# Patient Record
Sex: FEMALE | Race: BLACK OR AFRICAN AMERICAN | Employment: PART TIME | URBAN - METROPOLITAN AREA
[De-identification: names, ages, dates, MRNs, and addresses within clinical notes are randomized per-mention and may not be internally consistent; named-entity substitution may affect disease eponyms.]

---
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
## 2021-06-29 ENCOUNTER — HOSPITAL ENCOUNTER (EMERGENCY)
Age: 21
Discharge: HOME | End: 2021-06-29
Attending: EMERGENCY MEDICINE
Payer: COMMERCIAL

## 2021-06-29 VITALS
HEART RATE: 71 BPM | HEIGHT: 66 IN | RESPIRATION RATE: 14 BRPM | WEIGHT: 124 LBS | BODY MASS INDEX: 19.93 KG/M2 | TEMPERATURE: 99 F | SYSTOLIC BLOOD PRESSURE: 125 MMHG | DIASTOLIC BLOOD PRESSURE: 78 MMHG | OXYGEN SATURATION: 100 %

## 2021-06-29 DIAGNOSIS — B37.31 VAGINAL THRUSH: ICD-10-CM

## 2021-06-29 DIAGNOSIS — E87.6 HYPOKALEMIA: ICD-10-CM

## 2021-06-29 DIAGNOSIS — A59.9 TRICHOMONIASIS: ICD-10-CM

## 2021-06-29 DIAGNOSIS — R35.89 POLYURIA: ICD-10-CM

## 2021-06-29 DIAGNOSIS — R11.2 NON-INTRACTABLE VOMITING WITH NAUSEA, UNSPECIFIED VOMITING TYPE: Primary | ICD-10-CM

## 2021-06-29 LAB
ALBUMIN SERPL-MCNC: 3.7 G/DL (ref 3.4–5)
ALBUMIN/GLOB SERPL: 1 {RATIO} (ref 0.8–1.7)
ALP SERPL-CCNC: 108 U/L (ref 45–117)
ALT SERPL-CCNC: 19 U/L (ref 13–56)
ANION GAP SERPL CALC-SCNC: 4 MMOL/L (ref 3–18)
APPEARANCE UR: CLEAR
AST SERPL-CCNC: 17 U/L (ref 10–38)
BASOPHILS # BLD: 0 K/UL (ref 0–0.1)
BASOPHILS NFR BLD: 1 % (ref 0–2)
BILIRUB SERPL-MCNC: 0.3 MG/DL (ref 0.2–1)
BILIRUB UR QL: NEGATIVE
BUN SERPL-MCNC: 10 MG/DL (ref 7–18)
BUN/CREAT SERPL: 13 (ref 12–20)
CALCIUM SERPL-MCNC: 8.5 MG/DL (ref 8.5–10.1)
CHLORIDE SERPL-SCNC: 108 MMOL/L (ref 100–111)
CO2 SERPL-SCNC: 28 MMOL/L (ref 21–32)
COLOR UR: YELLOW
CREAT SERPL-MCNC: 0.8 MG/DL (ref 0.6–1.3)
DIFFERENTIAL METHOD BLD: ABNORMAL
EOSINOPHIL # BLD: 0 K/UL (ref 0–0.4)
EOSINOPHIL NFR BLD: 1 % (ref 0–5)
EPITH CASTS URNS QL MICRO: NORMAL /LPF (ref 0–5)
ERYTHROCYTE [DISTWIDTH] IN BLOOD BY AUTOMATED COUNT: 13.2 % (ref 11.6–14.5)
GLOBULIN SER CALC-MCNC: 3.8 G/DL (ref 2–4)
GLUCOSE SERPL-MCNC: 102 MG/DL (ref 74–99)
GLUCOSE UR STRIP.AUTO-MCNC: NEGATIVE MG/DL
HCG SERPL QL: NEGATIVE
HCT VFR BLD AUTO: 40.2 % (ref 35–45)
HGB BLD-MCNC: 13 G/DL (ref 12–16)
HGB UR QL STRIP: NEGATIVE
KETONES UR QL STRIP.AUTO: 40 MG/DL
LEUKOCYTE ESTERASE UR QL STRIP.AUTO: ABNORMAL
LIPASE SERPL-CCNC: 103 U/L (ref 73–393)
LYMPHOCYTES # BLD: 1.4 K/UL (ref 0.9–3.6)
LYMPHOCYTES NFR BLD: 32 % (ref 21–52)
MCH RBC QN AUTO: 28.9 PG (ref 24–34)
MCHC RBC AUTO-ENTMCNC: 32.3 G/DL (ref 31–37)
MCV RBC AUTO: 89.3 FL (ref 74–97)
MONOCYTES # BLD: 0.3 K/UL (ref 0.05–1.2)
MONOCYTES NFR BLD: 8 % (ref 3–10)
NEUTS SEG # BLD: 2.5 K/UL (ref 1.8–8)
NEUTS SEG NFR BLD: 59 % (ref 40–73)
NITRITE UR QL STRIP.AUTO: NEGATIVE
PH UR STRIP: 6 [PH] (ref 5–8)
PLATELET # BLD AUTO: 194 K/UL (ref 135–420)
PMV BLD AUTO: 12.3 FL (ref 9.2–11.8)
POTASSIUM SERPL-SCNC: 3.1 MMOL/L (ref 3.5–5.5)
PROT SERPL-MCNC: 7.5 G/DL (ref 6.4–8.2)
PROT UR STRIP-MCNC: NEGATIVE MG/DL
RBC # BLD AUTO: 4.5 M/UL (ref 4.2–5.3)
RBC #/AREA URNS HPF: NORMAL /HPF (ref 0–5)
SERVICE CMNT-IMP: NORMAL
SODIUM SERPL-SCNC: 140 MMOL/L (ref 136–145)
SP GR UR REFRACTOMETRY: 1.02 (ref 1–1.03)
UROBILINOGEN UR QL STRIP.AUTO: 1 EU/DL (ref 0.2–1)
WBC # BLD AUTO: 4.3 K/UL (ref 4.6–13.2)
WBC URNS QL MICRO: NORMAL /HPF (ref 0–4)
WET PREP GENITAL: NORMAL

## 2021-06-29 PROCEDURE — 84703 CHORIONIC GONADOTROPIN ASSAY: CPT

## 2021-06-29 PROCEDURE — 80053 COMPREHEN METABOLIC PANEL: CPT

## 2021-06-29 PROCEDURE — 74011250637 HC RX REV CODE- 250/637: Performed by: EMERGENCY MEDICINE

## 2021-06-29 PROCEDURE — 87210 SMEAR WET MOUNT SALINE/INK: CPT

## 2021-06-29 PROCEDURE — 96372 THER/PROPH/DIAG INJ SC/IM: CPT

## 2021-06-29 PROCEDURE — 81001 URINALYSIS AUTO W/SCOPE: CPT

## 2021-06-29 PROCEDURE — 74011250636 HC RX REV CODE- 250/636: Performed by: EMERGENCY MEDICINE

## 2021-06-29 PROCEDURE — 83690 ASSAY OF LIPASE: CPT

## 2021-06-29 PROCEDURE — 99284 EMERGENCY DEPT VISIT MOD MDM: CPT

## 2021-06-29 PROCEDURE — 99283 EMERGENCY DEPT VISIT LOW MDM: CPT

## 2021-06-29 PROCEDURE — 87086 URINE CULTURE/COLONY COUNT: CPT

## 2021-06-29 PROCEDURE — 74011000250 HC RX REV CODE- 250: Performed by: EMERGENCY MEDICINE

## 2021-06-29 PROCEDURE — 87491 CHLMYD TRACH DNA AMP PROBE: CPT

## 2021-06-29 PROCEDURE — 85025 COMPLETE CBC W/AUTO DIFF WBC: CPT

## 2021-06-29 PROCEDURE — 74011250637 HC RX REV CODE- 250/637: Performed by: PHYSICIAN ASSISTANT

## 2021-06-29 PROCEDURE — 87591 N.GONORRHOEAE DNA AMP PROB: CPT

## 2021-06-29 RX ORDER — DOXYCYCLINE HYCLATE 100 MG
100 TABLET ORAL 2 TIMES DAILY
Qty: 14 TABLET | Refills: 0 | Status: SHIPPED | OUTPATIENT
Start: 2021-06-29 | End: 2021-07-06

## 2021-06-29 RX ORDER — FLUCONAZOLE 200 MG/1
200 TABLET ORAL
Status: COMPLETED | OUTPATIENT
Start: 2021-06-29 | End: 2021-06-29

## 2021-06-29 RX ORDER — ONDANSETRON 2 MG/ML
4 INJECTION INTRAMUSCULAR; INTRAVENOUS
Status: DISCONTINUED | OUTPATIENT
Start: 2021-06-29 | End: 2021-06-30 | Stop reason: HOSPADM

## 2021-06-29 RX ORDER — POTASSIUM CHLORIDE 20 MEQ/1
40 TABLET, EXTENDED RELEASE ORAL
Status: COMPLETED | OUTPATIENT
Start: 2021-06-29 | End: 2021-06-29

## 2021-06-29 RX ORDER — POTASSIUM CHLORIDE 20 MEQ/1
20 TABLET, EXTENDED RELEASE ORAL 3 TIMES DAILY
Qty: 9 TABLET | Refills: 0 | Status: SHIPPED | OUTPATIENT
Start: 2021-06-29 | End: 2021-07-02

## 2021-06-29 RX ORDER — ONDANSETRON 4 MG/1
TABLET, ORALLY DISINTEGRATING ORAL
Qty: 10 TABLET | Refills: 0 | Status: SHIPPED | OUTPATIENT
Start: 2021-06-29 | End: 2021-09-15

## 2021-06-29 RX ORDER — METRONIDAZOLE 500 MG/1
500 TABLET ORAL 2 TIMES DAILY
Qty: 14 TABLET | Refills: 0 | Status: SHIPPED | OUTPATIENT
Start: 2021-06-29 | End: 2021-07-06

## 2021-06-29 RX ORDER — DOXYCYCLINE 100 MG/1
100 CAPSULE ORAL
Status: COMPLETED | OUTPATIENT
Start: 2021-06-29 | End: 2021-06-29

## 2021-06-29 RX ADMIN — DOXYCYCLINE HYCLATE 100 MG: 100 CAPSULE ORAL at 21:30

## 2021-06-29 RX ADMIN — POTASSIUM CHLORIDE 40 MEQ: 1500 TABLET, EXTENDED RELEASE ORAL at 20:41

## 2021-06-29 RX ADMIN — FLUCONAZOLE 200 MG: 200 TABLET ORAL at 21:30

## 2021-06-29 RX ADMIN — LIDOCAINE HYDROCHLORIDE 500 MG: 10 INJECTION, SOLUTION EPIDURAL; INFILTRATION; INTRACAUDAL; PERINEURAL at 21:30

## 2021-06-29 NOTE — Clinical Note
[address] [facility] EMERGENCY DEPT  [id number] [facility] [address] [id number] [phone]    Work/School Note    Date: 6/29/2021    To Whom It May concern:    Gaurav Summers was seen and treated today in the emergency room by the following provider(s):  Attending Provider: Farooq Alonzo DO  Physician Assistant: Doug Rodriguez. Gaurav Summers is excused from work/school on 06/29/21 and 06/30/21. She is medically clear to return to work/school on 7/1/2021.        Sincerely,          EYAD Ko

## 2021-06-29 NOTE — ED TRIAGE NOTES
Patient c/o cramping to pelvic area x one month. C/o urinary frequency x 3 days. C/o nausea. States vomiting x one today. She states vomiting yestereday. LMP: Mid - May.

## 2021-06-29 NOTE — ED PROVIDER NOTES
EMERGENCY DEPARTMENT HISTORY AND PHYSICAL EXAM    5:55 PM      Date: 6/29/2021  Patient Name: Johanna Canseco    History of Presenting Illness     Chief Complaint   Patient presents with    Urinary Frequency    Nausea       History Provided By: Patient    Additional History (Context): Johanna Canseco is a 22 y.o. female with hx of seizures, UTI, and other noted PMH who presents with c/o lower abdominal cramping, n/v, and polyuria x 3 days. Denies fever or chills, chest pain, shortness of breath, diarrhea, dysuria, hematuria, vaginal discharge. Denies sick contacts. Notes concern for pregnancy, did not take a pregnancy test at home. LMP: 5/20    PCP: None    Current Facility-Administered Medications   Medication Dose Route Frequency Provider Last Rate Last Admin    ondansetron (ZOFRAN) injection 4 mg  4 mg IntraVENous NOW [place], [place]        potassium chloride (K-DUR, KLOR-CON) SR tablet 40 mEq  40 mEq Oral NOW [place], [place]         Current Outpatient Medications   Medication Sig Dispense Refill    ondansetron (Zofran ODT) 4 mg disintegrating tablet Take 1-2 tablets every 6-8 hours as needed for nausea and vomiting. 10 Tablet 0    potassium chloride (K-DUR, KLOR-CON) 20 mEq tablet Take 1 Tablet by mouth three (3) times daily for 3 days. 9 Tablet 0       Past History     Past Medical History:  Past Medical History:   Diagnosis Date    Chlamydia     Conjunctivitis     Gonorrhea     Ill-defined condition     Seizures (HCC)     Trichomoniasis     UTI (urinary tract infection)     Vaginal yeast infection        Past Surgical History:  History reviewed. No pertinent surgical history. Family History:  History reviewed. No pertinent family history. Social History:  Social History     Tobacco Use    Smoking status: Current Every Day Smoker    Smokeless tobacco: Never Used   Substance Use Topics    Alcohol use: No    Drug use: Yes     Types: Marijuana       Allergies:   Allergies   Allergen Reactions    Ciprofloxacin Rash         Review of Systems       Review of Systems   Constitutional: Negative for chills and fever. Respiratory: Negative for shortness of breath. Cardiovascular: Negative for chest pain. Gastrointestinal: Positive for nausea and vomiting. Negative for abdominal pain. Genitourinary: Positive for frequency. Skin: Negative for rash. Neurological: Negative for weakness. All other systems reviewed and are negative. Physical Exam     Visit Vitals  /89 (BP 1 Location: Left upper arm, BP Patient Position: At rest)   Pulse 71   Temp 99 °F (37.2 °C)   Resp 14   Ht 5' 6\" (1.676 m)   Wt 56.2 kg (124 lb)   LMP 05/15/2021 (Approximate)   SpO2 100%   BMI 20.01 kg/m²         Physical Exam  Vitals and nursing note reviewed. Constitutional:       General: She is not in acute distress. Appearance: Normal appearance. She is well-developed. She is not ill-appearing, toxic-appearing or diaphoretic. HENT:      Head: Normocephalic and atraumatic. Cardiovascular:      Rate and Rhythm: Normal rate and regular rhythm. Heart sounds: Normal heart sounds. No murmur heard. No friction rub. No gallop. Pulmonary:      Effort: Pulmonary effort is normal. No respiratory distress. Breath sounds: Normal breath sounds. No wheezing or rales. Abdominal:      General: Abdomen is flat. There is no distension. Palpations: Abdomen is soft. Tenderness: There is no abdominal tenderness. There is no right CVA tenderness, left CVA tenderness, guarding or rebound. Musculoskeletal:         General: Normal range of motion. Cervical back: Normal range of motion and neck supple. Skin:     General: Skin is warm. Findings: No rash. Neurological:      Mental Status: She is alert.            Diagnostic Study Results     Labs -  Recent Results (from the past 12 hour(s))   URINALYSIS W/ RFLX MICROSCOPIC    Collection Time: 06/29/21  6:01 PM   Result Value Ref Range    Color YELLOW      Appearance CLEAR      Specific gravity 1.025 1.005 - 1.030      pH (UA) 6.0 5.0 - 8.0      Protein Negative NEG mg/dL    Glucose Negative NEG mg/dL    Ketone 40 (A) NEG mg/dL    Bilirubin Negative NEG      Blood Negative NEG      Urobilinogen 1.0 0.2 - 1.0 EU/dL    Nitrites Negative NEG      Leukocyte Esterase SMALL (A) NEG     URINE MICROSCOPIC ONLY    Collection Time: 06/29/21  6:01 PM   Result Value Ref Range    WBC 0 to 3 0 - 4 /hpf    RBC 0 to 3 0 - 5 /hpf    Epithelial cells 1+ 0 - 5 /lpf   CBC WITH AUTOMATED DIFF    Collection Time: 06/29/21  6:59 PM   Result Value Ref Range    WBC 4.3 (L) 4.6 - 13.2 K/uL    RBC 4.50 4. 20 - 5.30 M/uL    HGB 13.0 12.0 - 16.0 g/dL    HCT 40.2 35.0 - 45.0 %    MCV 89.3 74.0 - 97.0 FL    MCH 28.9 24.0 - 34.0 PG    MCHC 32.3 31.0 - 37.0 g/dL    RDW 13.2 11.6 - 14.5 %    PLATELET 579 240 - 906 K/uL    MPV 12.3 (H) 9.2 - 11.8 FL    NEUTROPHILS 59 40 - 73 %    LYMPHOCYTES 32 21 - 52 %    MONOCYTES 8 3 - 10 %    EOSINOPHILS 1 0 - 5 %    BASOPHILS 1 0 - 2 %    ABS. NEUTROPHILS 2.5 1.8 - 8.0 K/UL    ABS. LYMPHOCYTES 1.4 0.9 - 3.6 K/UL    ABS. MONOCYTES 0.3 0.05 - 1.2 K/UL    ABS. EOSINOPHILS 0.0 0.0 - 0.4 K/UL    ABS. BASOPHILS 0.0 0.0 - 0.1 K/UL    DF AUTOMATED     METABOLIC PANEL, COMPREHENSIVE    Collection Time: 06/29/21  6:59 PM   Result Value Ref Range    Sodium 140 136 - 145 mmol/L    Potassium 3.1 (L) 3.5 - 5.5 mmol/L    Chloride 108 100 - 111 mmol/L    CO2 28 21 - 32 mmol/L    Anion gap 4 3.0 - 18 mmol/L    Glucose 102 (H) 74 - 99 mg/dL    BUN 10 7.0 - 18 MG/DL    Creatinine 0.80 0.6 - 1.3 MG/DL    BUN/Creatinine ratio 13 12 - 20      GFR est AA >60 >60 ml/min/1.73m2    GFR est non-AA >60 >60 ml/min/1.73m2    Calcium 8.5 8.5 - 10.1 MG/DL    Bilirubin, total 0.3 0.2 - 1.0 MG/DL    ALT (SGPT) 19 13 - 56 U/L    AST (SGOT) 17 10 - 38 U/L    Alk.  phosphatase 108 45 - 117 U/L    Protein, total 7.5 6.4 - 8.2 g/dL    Albumin 3.7 3.4 - 5.0 g/dL    Globulin 3.8 2.0 - 4.0 g/dL    A-G Ratio 1.0 0.8 - 1.7     LIPASE    Collection Time: 06/29/21  6:59 PM   Result Value Ref Range    Lipase 103 73 - 393 U/L   HCG QL SERUM    Collection Time: 06/29/21  6:59 PM   Result Value Ref Range    HCG, Ql. Negative NEG         Radiologic Studies -   No orders to display         Medical Decision Making   I am the first provider for this patient. I reviewed the vital signs, available nursing notes, past medical history, past surgical history, family history and social history. Vital Signs-Reviewed the patient's vital signs. Records Reviewed: Nursing Notes and Old Medical Records (Time of Review: 5:55 PM)    ED Course: Progress Notes, Reevaluation, and Consults:  7:39 PM Reviewed results with patient. Resting comfortably no distress tolerating PO. Discussed need for close outpatient follow-up this week for reassessment. Discussed strict return precautions, including fever, vomiting, or any other medical concerns. Provider Notes (Medical Decision Making): 80-year-old female who presents to the ED due to lower abdominal cramping, nausea, vomiting, and polyuria x3 days. Afebrile, nontoxic-appearing, looks well. Abdomen soft and nontender to palpation. Labs demonstrate chronic leukopenia, hypokalemia. HCG negative. UA without evidence of infection, culture sent and pending. Tolerating PO, no distress. Stable for discharge with symptomatic management and close outpatient follow-up for further assessment. Strict return precautions provided. Diagnosis     Clinical Impression:   1. Non-intractable vomiting with nausea, unspecified vomiting type    2. Polyuria    3.  Hypokalemia        Disposition: home     Follow-up Information     [facility] With Specialties Details [facility]  If symptoms worsen [address] [id number]  [phone]    [facility] Medicine  Schedule an appointment as soon as possible for a visit   [address]  [facility] [address]           Patient's Medications   Start Taking    ONDANSETRON (ZOFRAN ODT) 4 MG DISINTEGRATING TABLET    Take 1-2 tablets every 6-8 hours as needed for nausea and vomiting. POTASSIUM CHLORIDE (K-DUR, KLOR-CON) 20 MEQ TABLET    Take 1 Tablet by mouth three (3) times daily for 3 days. Continue Taking    No medications on file   These Medications have changed    No medications on file   Stop Taking    NORELGESTROMIN-ETHINYL ESTRADIOL Rise Corti) 150-35 MCG/24 HR    1 Patch by TransDERmal route Every Saturday. ONDANSETRON (ZOFRAN ODT) 4 MG DISINTEGRATING TABLET    Take 1 Tab by mouth every eight (8) hours as needed for Nausea. Dictation disclaimer:  Please note that this dictation was completed with [employer], the [employer] voice recognition software. Quite often unanticipated grammatical, syntax, homophones, and other interpretive errors are inadvertently transcribed by the computer software. Please disregard these errors. Please excuse any errors that have escaped final proofreading.

## 2021-06-29 NOTE — ED NOTES
Pt c/o pelvic cramping, nausea, and vomitting  Unknown if pregnant  PIV inserted and labs walked to lab

## 2021-06-30 NOTE — ED NOTES
I have introduced myself to the patient and discussed plan of care. Patient declines Zofran at this time. Patient has questions about lab work, requests STD testing. I have discussed with patient that blood STD testing will need to be completed by OB/PCP/ Health Department. Patient verbalizes understanding. Patient requesting vaginal swabs, sent to bathroom for self collect. Dr. Nirmala Gasca notified of change of treatment plan.

## 2021-06-30 NOTE — ED NOTES
Patient has been discharged by JESUS KEVIN prior to my shift. As per patient's nurse that I was informed the patient wants to be checked for STDs. Wet prep ordered yeast present and trichomonas. Prescription will be sent to pharmacy  Patient wants to be preemptively treated for chlamydia and gonorrhea  Informed of testing process; patient advised to follow up results on my chart. Discussed the limited number of STDs that are tested for in ED and referred to health department for full STD testing. Recommended 7 days abstinence after completion of treatment, safe sex, and informing all sexual partners so that they can be tested and treated as well. Patient expressed understanding.

## 2021-07-01 LAB
BACTERIA SPEC CULT: NORMAL
CC UR VC: NORMAL
SERVICE CMNT-IMP: NORMAL

## 2021-07-02 LAB
C TRACH RRNA SPEC QL NAA+PROBE: NEGATIVE
N GONORRHOEA RRNA SPEC QL NAA+PROBE: NEGATIVE
PLEASE NOTE:, 188601: NORMAL
SPECIMEN SOURCE: NORMAL

## 2021-09-14 ENCOUNTER — HOSPITAL ENCOUNTER (EMERGENCY)
Age: 21
Discharge: LEFT BEFORE DISCHARGE | End: 2021-09-14
Payer: COMMERCIAL

## 2021-09-14 PROCEDURE — 75810000275 HC EMERGENCY DEPT VISIT NO LEVEL OF CARE

## 2021-09-15 ENCOUNTER — HOSPITAL ENCOUNTER (EMERGENCY)
Age: 21
Discharge: HOME | End: 2021-09-16
Attending: EMERGENCY MEDICINE

## 2021-09-15 ENCOUNTER — APPOINTMENT (OUTPATIENT)
Dept: ULTRASOUND IMAGING | Age: 21
End: 2021-09-15
Attending: STUDENT IN AN ORGANIZED HEALTH CARE EDUCATION/TRAINING PROGRAM

## 2021-09-15 DIAGNOSIS — O21.9 NAUSEA AND VOMITING IN PREGNANCY: ICD-10-CM

## 2021-09-15 DIAGNOSIS — Z3A.01 LESS THAN 8 WEEKS GESTATION OF PREGNANCY: Primary | ICD-10-CM

## 2021-09-15 LAB
ABO + RH BLD: NORMAL
ALBUMIN SERPL-MCNC: 4.5 G/DL (ref 3.4–5)
ALBUMIN/GLOB SERPL: 1.1 {RATIO} (ref 0.8–1.7)
ALP SERPL-CCNC: 98 U/L (ref 45–117)
ALT SERPL-CCNC: 18 U/L (ref 13–56)
ANION GAP SERPL CALC-SCNC: 9 MMOL/L (ref 3–18)
APPEARANCE UR: CLEAR
AST SERPL-CCNC: 24 U/L (ref 10–38)
BASOPHILS # BLD: 0 K/UL (ref 0–0.1)
BASOPHILS NFR BLD: 0 % (ref 0–2)
BILIRUB SERPL-MCNC: 0.6 MG/DL (ref 0.2–1)
BILIRUB UR QL: NEGATIVE
BUN SERPL-MCNC: 11 MG/DL (ref 7–18)
BUN/CREAT SERPL: 17 (ref 12–20)
CALCIUM SERPL-MCNC: 9.7 MG/DL (ref 8.5–10.1)
CHLORIDE SERPL-SCNC: 103 MMOL/L (ref 100–111)
CO2 SERPL-SCNC: 23 MMOL/L (ref 21–32)
COLOR UR: YELLOW
CREAT SERPL-MCNC: 0.65 MG/DL (ref 0.6–1.3)
DIFFERENTIAL METHOD BLD: ABNORMAL
EOSINOPHIL # BLD: 0 K/UL (ref 0–0.4)
EOSINOPHIL NFR BLD: 0 % (ref 0–5)
ERYTHROCYTE [DISTWIDTH] IN BLOOD BY AUTOMATED COUNT: 13.9 % (ref 11.6–14.5)
GLOBULIN SER CALC-MCNC: 4 G/DL (ref 2–4)
GLUCOSE SERPL-MCNC: 76 MG/DL (ref 74–99)
GLUCOSE UR STRIP.AUTO-MCNC: NEGATIVE MG/DL
HCG SERPL-ACNC: ABNORMAL MIU/ML (ref 0–10)
HCG UR QL: POSITIVE
HCT VFR BLD AUTO: 42.6 % (ref 35–45)
HGB BLD-MCNC: 14.1 G/DL (ref 12–16)
HGB UR QL STRIP: NEGATIVE
KETONES UR QL STRIP.AUTO: ABNORMAL MG/DL
LEUKOCYTE ESTERASE UR QL STRIP.AUTO: NEGATIVE
LIPASE SERPL-CCNC: 80 U/L (ref 73–393)
LYMPHOCYTES # BLD: 2.7 K/UL (ref 0.9–3.6)
LYMPHOCYTES NFR BLD: 34 % (ref 21–52)
MCH RBC QN AUTO: 28.8 PG (ref 24–34)
MCHC RBC AUTO-ENTMCNC: 33.1 G/DL (ref 31–37)
MCV RBC AUTO: 87.1 FL (ref 78–100)
MONOCYTES # BLD: 0.4 K/UL (ref 0.05–1.2)
MONOCYTES NFR BLD: 5 % (ref 3–10)
NEUTS SEG # BLD: 4.7 K/UL (ref 1.8–8)
NEUTS SEG NFR BLD: 61 % (ref 40–73)
NITRITE UR QL STRIP.AUTO: NEGATIVE
PH UR STRIP: 6.5 [PH] (ref 5–8)
PLATELET # BLD AUTO: 262 K/UL (ref 135–420)
PLATELET COMMENTS,PCOM: ABNORMAL
PMV BLD AUTO: 12.3 FL (ref 9.2–11.8)
POTASSIUM SERPL-SCNC: 3.9 MMOL/L (ref 3.5–5.5)
PROT SERPL-MCNC: 8.5 G/DL (ref 6.4–8.2)
PROT UR STRIP-MCNC: NEGATIVE MG/DL
RBC # BLD AUTO: 4.89 M/UL (ref 4.2–5.3)
RBC MORPH BLD: ABNORMAL
SODIUM SERPL-SCNC: 135 MMOL/L (ref 136–145)
SP GR UR REFRACTOMETRY: 1.02 (ref 1–1.03)
UROBILINOGEN UR QL STRIP.AUTO: 0.2 EU/DL (ref 0.2–1)
WBC # BLD AUTO: 7.8 K/UL (ref 4.6–13.2)

## 2021-09-15 PROCEDURE — 76817 TRANSVAGINAL US OBSTETRIC: CPT

## 2021-09-15 PROCEDURE — 80053 COMPREHEN METABOLIC PANEL: CPT

## 2021-09-15 PROCEDURE — 99283 EMERGENCY DEPT VISIT LOW MDM: CPT

## 2021-09-15 PROCEDURE — 96374 THER/PROPH/DIAG INJ IV PUSH: CPT

## 2021-09-15 PROCEDURE — 85025 COMPLETE CBC W/AUTO DIFF WBC: CPT

## 2021-09-15 PROCEDURE — 87491 CHLMYD TRACH DNA AMP PROBE: CPT

## 2021-09-15 PROCEDURE — 74011250636 HC RX REV CODE- 250/636: Performed by: STUDENT IN AN ORGANIZED HEALTH CARE EDUCATION/TRAINING PROGRAM

## 2021-09-15 PROCEDURE — 84702 CHORIONIC GONADOTROPIN TEST: CPT

## 2021-09-15 PROCEDURE — 81003 URINALYSIS AUTO W/O SCOPE: CPT

## 2021-09-15 PROCEDURE — 83690 ASSAY OF LIPASE: CPT

## 2021-09-15 PROCEDURE — 81025 URINE PREGNANCY TEST: CPT

## 2021-09-15 PROCEDURE — 86900 BLOOD TYPING SEROLOGIC ABO: CPT

## 2021-09-15 RX ORDER — ONDANSETRON 2 MG/ML
4 INJECTION INTRAMUSCULAR; INTRAVENOUS
Status: COMPLETED | OUTPATIENT
Start: 2021-09-15 | End: 2021-09-15

## 2021-09-15 RX ADMIN — ONDANSETRON 4 MG: 2 INJECTION INTRAMUSCULAR; INTRAVENOUS at 20:34

## 2021-09-15 NOTE — ED TRIAGE NOTES
Patient c/o urinary frequency, nausea, two episodes of vomiting today and pelvic pain. She denies vaginal discharge.

## 2021-09-15 NOTE — ED PROVIDER NOTES
EMERGENCY DEPARTMENT HISTORY AND PHYSICAL EXAM    7:01 PM    Date: 9/15/2021  Patient Name: Megan Rosas    History of Presenting Illness     Chief Complaint   Patient presents with    Pelvic Pain    Urinary Frequency    Nausea       History Provided By: Patient  Location/Duration/Severity/Modifying factors   HPI    PCP: None     Pepe Schulz is a 23 yo F presenting with acute on chronic pelvic pain that worsened this morning. She reports always having intermittent, cramping pelvic pain, especially during her menstrual cycle, but the severity of the pain increased this morning. Pelvic pain is now about 4/10 in severity and non-radiating. Patient reports that she missed 2 menstrual cycles thus far. She reports nausea and clear emesis x 3 days. She had 2 episodes of emesis this morning. She reports urinary frequency/urgency but no dysuria. She denies vaginal discharge or odor. Denies vaginal bleeding. Patient is sexually active with one partner. She does not use any contraception. Past History     Past Medical History:  Past Medical History:   Diagnosis Date    Chlamydia     Conjunctivitis     Gonorrhea     Ill-defined condition     Seizures ([address]     Trichomoniasis     UTI (urinary tract infection)     Vaginal yeast infection        Past Surgical History:  History reviewed. No pertinent surgical history. Family History:  History reviewed. No pertinent family history. Social History:  Social History     Tobacco Use    Smoking status: Current Every Day Smoker    Smokeless tobacco: Never Used   Substance Use Topics    Alcohol use: Yes    Drug use: Yes     Types: Marijuana       Allergies: Allergies   Allergen Reactions    Ciprofloxacin Rash         Review of Systems     Review of Systems   Constitutional: Negative for activity change, chills, fatigue and fever. Respiratory: Negative for cough, chest tightness and shortness of breath.     Cardiovascular: Negative for chest pain and palpitations. Gastrointestinal: Positive for nausea and vomiting. Negative for abdominal distention, abdominal pain, constipation and diarrhea. Genitourinary: Positive for difficulty urinating, frequency, pelvic pain and urgency. Negative for dysuria, flank pain, hematuria, menstrual problem, vaginal bleeding, vaginal discharge and vaginal pain. Musculoskeletal: Negative for arthralgias and myalgias. Neurological: Negative for dizziness, tremors, weakness, light-headedness, numbness and headaches. Hematological: Negative for adenopathy. Does not bruise/bleed easily. Psychiatric/Behavioral: Negative for agitation. Physical Exam     Visit Vitals  /67 (BP 1 Location: Left upper arm, BP Patient Position: At rest)   Pulse 86   Temp 99.2 °F (37.3 °C)   Resp 16   Ht 5' 2\" (1.575 m)   Wt 59 kg (130 lb)   LMP 07/15/2021 (LMP Unknown) Comment: irregular menses   SpO2 98%   BMI 23.78 kg/m²       Physical Exam  Exam conducted with a chaperone present. Constitutional:       General: She is not in acute distress. Appearance: Normal appearance. She is not toxic-appearing. HENT:      Head: Normocephalic and atraumatic. Nose: Nose normal.      Mouth/Throat:      Mouth: Mucous membranes are moist.   Eyes:      Extraocular Movements: Extraocular movements intact. Pupils: Pupils are equal, round, and reactive to light. Cardiovascular:      Rate and Rhythm: Normal rate and regular rhythm. Pulses: Normal pulses. Heart sounds: Normal heart sounds. No murmur heard. No gallop. Pulmonary:      Effort: Pulmonary effort is normal. No respiratory distress. Breath sounds: Normal breath sounds. No wheezing. Abdominal:      General: Abdomen is flat. Bowel sounds are normal. There is no distension. Palpations: Abdomen is soft. Tenderness: There is abdominal tenderness. There is guarding. There is no right CVA tenderness or left CVA tenderness.    Genitourinary:     General: Normal vulva. Exam position: Knee-chest position. Pubic Area: No rash. Labia:         Right: No rash or lesion. Left: No rash or lesion. Urethra: No prolapse, urethral pain, urethral swelling or urethral lesion. Comments:   External genitalia: No lesions  Vagina: Rugated, no discharge, no odor  Cervix: mild motion tenderness, patent os without discharge, no lesions. Uterus: Small, mobile, midline, anteverted, non-tender. Adnexae: No masses or tenderness bilaterally. Musculoskeletal:         General: Normal range of motion. Cervical back: Normal range of motion. No rigidity or tenderness. Skin:     General: Skin is warm. Capillary Refill: Capillary refill takes less than 2 seconds. Neurological:      General: No focal deficit present. Mental Status: She is alert. Psychiatric:         Mood and Affect: Mood normal.       Diagnostic Study Results     Labs -  Recent Results (from the past 12 hour(s))   URINALYSIS W/ RFLX MICROSCOPIC    Collection Time: 09/15/21  6:48 PM   Result Value Ref Range    Color YELLOW      Appearance CLEAR      Specific gravity 1.021 1.005 - 1.030      pH (UA) 6.5 5.0 - 8.0      Protein Negative NEG mg/dL    Glucose Negative NEG mg/dL    Ketone TRACE (A) NEG mg/dL    Bilirubin Negative NEG      Blood Negative NEG      Urobilinogen 0.2 0.2 - 1.0 EU/dL    Nitrites Negative NEG      Leukocyte Esterase Negative NEG     HCG URINE, QL    Collection Time: 09/15/21  6:48 PM   Result Value Ref Range    HCG urine, QL Positive (A) NEG         Radiologic Studies -   US UTS TRANSVAGINAL OB    (Results Pending)       Medical Decision Making   I am the first provider for this patient. I reviewed the vital signs, available nursing notes, past medical history, past surgical history, family history and social history. Vital Signs-Reviewed the patient's vital signs. Records Reviewed:  Old Medical Records (Time of Review: 7:01 PM)    ED Course: Progress Notes, Reevaluation, and Consults:    Provider Notes (Medical Decision Making):   TAYA Singh is a 23 yo F presenting with acute on chronic pelvic pain that worsened this morning and nausea/emesis x 3 days. DDx to include UTI, ovarian cyst torsion, appendicitis, PID, ectopic pregnancy vs miscarriage. UA negative for infection. Urine HCG positive for pregnancy. Pelvic exam with no adnexal masses or tenderness. Some mild cervical motion tenderness on exam. Ordered urine chlamydia/neisseria NAAT. CBC, CMP, lipase, B-hcg quantitative and blood type pending results. Ordered transvaginal ultrasound STAT to rule out ectopic pregnancy. Dr. Nikunj Sheffield will be following up on the read of the TVUS. Diagnosis     Clinical Impression:   1. Pregnancy, unspecified gestational age      Disposition: Pending    Follow-up Information    None          Patient's Medications   Start Taking    No medications on file   Continue Taking    No medications on file   These Medications have changed    No medications on file   Stop Taking    ONDANSETRON (ZOFRAN ODT) 4 MG DISINTEGRATING TABLET    Take 1-2 tablets every 6-8 hours as needed for nausea and vomiting.        Lissette Dunaway MD, PGY-2   [facility]   September 15, 2021, 7:01 PM

## 2021-09-16 VITALS
HEIGHT: 62 IN | TEMPERATURE: 99.2 F | HEART RATE: 76 BPM | RESPIRATION RATE: 16 BRPM | DIASTOLIC BLOOD PRESSURE: 58 MMHG | BODY MASS INDEX: 23.92 KG/M2 | OXYGEN SATURATION: 98 % | WEIGHT: 130 LBS | SYSTOLIC BLOOD PRESSURE: 113 MMHG

## 2022-01-23 ENCOUNTER — HOSPITAL ENCOUNTER (EMERGENCY)
Age: 22
Discharge: OTHER INSTITUTION | End: 2022-01-23
Attending: STUDENT IN AN ORGANIZED HEALTH CARE EDUCATION/TRAINING PROGRAM
Payer: COMMERCIAL

## 2022-01-23 VITALS
HEIGHT: 62 IN | RESPIRATION RATE: 22 BRPM | HEART RATE: 133 BPM | BODY MASS INDEX: 24.66 KG/M2 | OXYGEN SATURATION: 100 % | TEMPERATURE: 99.5 F | WEIGHT: 134 LBS | SYSTOLIC BLOOD PRESSURE: 111 MMHG | DIASTOLIC BLOOD PRESSURE: 73 MMHG

## 2022-01-23 DIAGNOSIS — R10.30 ABDOMINAL PAIN, LOWER: Primary | ICD-10-CM

## 2022-01-23 DIAGNOSIS — Z3A.24 24 WEEKS GESTATION OF PREGNANCY: ICD-10-CM

## 2022-01-23 PROCEDURE — 99283 EMERGENCY DEPT VISIT LOW MDM: CPT

## 2022-01-23 NOTE — ED NOTES
[facility] at bedside preparing patient for transport to [address] [place] [address] and Delivery.

## 2022-01-23 NOTE — ED NOTES
Provider has looked by ultrasound baby is not in a delivering position at this time. Patient states Cheryl Ricks is 24 weeks pregnant and has been cramping since 1100\". Patient states Cheryl Ricks had a large amount of fluid in her underpants today\".

## 2022-01-23 NOTE — ED NOTES
Report called to Zafar Espitia RN at [address] [place] [address] Ext and Delivery. Receiving nurse has no further questions or concerns at this time.

## 2022-01-23 NOTE — ED TRIAGE NOTES
Patient states that she is under the care of [facility] related to her pregnancy and plans to deliver at [facility]. She states being 24 weeks pregnant and advises of pelvic cramping, back pain, and vaginal pressure that began today at 1100. She denies informing OB/GYN of complaint. Denies vaginal bleeding or discharge. She states having large amount of vaginal discharge in underwear on awakening this morning.

## 2022-01-23 NOTE — ED PROVIDER NOTES
EMERGENCY DEPARTMENT HISTORY AND PHYSICAL EXAM    5:13 PM    Date: 2022  Patient Name: Chelsea Dacosta    History of Presenting Illness     Chief Complaint   Patient presents with    Pregnancy Problem    Abdominal Cramping    Back Pain       History Provided By: Patient  Location/Duration/Severity/Modifying factors   HPI  Chelsea Dacosta is a 22 y.o. female  currently 24 weeks pregnant who follows with the [facility] OB/GYN presenting for evaluation of abdominal pain. She says that she woke up this morning and had a rush of fluid and discharge. Around 11:00 today she started to have cramping pain in her lower abdomen as well as pelvic pressure and back pain. She decided to come to her nearest ER at this time. Has not taken anything for her symptoms. Nothing makes them better or worse. She has not yet updated OB/GYN that she is here. History limited by acuity    PCP: None    Past History     Past Medical History:  Past Medical History:   Diagnosis Date    Chlamydia     Conjunctivitis     Gonorrhea     Ill-defined condition     Seizures (HCC)     Trichomoniasis     UTI (urinary tract infection)     Vaginal yeast infection        Past Surgical History:  History reviewed. No pertinent surgical history. Family History:  History reviewed. No pertinent family history. Social History:  Social History     Tobacco Use    Smoking status: Former Smoker    Smokeless tobacco: Never Used   Substance Use Topics    Alcohol use: Not Currently    Drug use: Not Currently     Types: Marijuana       Allergies: Allergies   Allergen Reactions    Ciprofloxacin Rash       I reviewed and confirmed the above information with patient and updated as necessary. Review of Systems     Review of Systems   Constitutional: Negative for diaphoresis and fever. HENT: Negative for ear pain and sore throat. Eyes:        No acute change in vision   Respiratory: Negative for cough and shortness of breath. Cardiovascular: Negative for chest pain and leg swelling. Gastrointestinal: Negative for abdominal pain and vomiting. Genitourinary: Positive for vaginal discharge. Negative for dysuria. Musculoskeletal: Negative for neck pain. Skin: Negative for wound. Neurological: Negative for weakness and headaches. Physical Exam     Visit Vitals  /73 (BP 1 Location: Left upper arm, BP Patient Position: At rest)   Pulse (!) 133   Temp 99.5 °F (37.5 °C)   Resp 22   Ht 5' 2\" (1.575 m)   Wt 60.8 kg (134 lb)   LMP 07/15/2021 (LMP Unknown) Comment: irregular menses   SpO2 100%   BMI 24.51 kg/m²       Physical Exam  Vitals and nursing note reviewed. Constitutional:       Comments: Adult female resting comfortably, not distressed. HENT:      Mouth/Throat:      Mouth: Mucous membranes are moist.   Eyes:      Pupils: Pupils are equal, round, and reactive to light. Cardiovascular:      Rate and Rhythm: Normal rate and regular rhythm. Pulses: Normal pulses. Pulmonary:      Effort: Pulmonary effort is normal.      Breath sounds: Normal breath sounds. Abdominal:      Palpations: Abdomen is soft. Tenderness: There is no abdominal tenderness. Genitourinary:     Comments: Cervix is closed, thick and high. Musculoskeletal:         General: No signs of injury. Normal range of motion. Cervical back: Normal range of motion. Skin:     General: Skin is warm. Neurological:      General: No focal deficit present. Mental Status: She is alert and oriented to person, place, and time. Mental status is at baseline. Diagnostic Study Results     Labs -  No results found for this or any previous visit (from the past 24 hour(s)). Radiologic Studies -   No orders to display           Medical Decision Making   I am the first provider for this patient.     I reviewed the vital signs, available nursing notes, past medical history, past surgical history, family history and social history. Vital Signs-Reviewed the patient's vital signs. Records Reviewed: Nursing Notes and Old Medical Records (Time of Review: 5:13 PM)    Provider Notes (Medical Decision Making):   MDM  78-year-old female currently 24 weeks pregnant presenting for abdominal pain consider due to rupture of membranes, premature delivery, round ligament pain, anxiety, miscarriage, others    ED Course: Progress Notes, Reevaluation, and Consults: Patient arrives afebrile, tachycardic but otherwise hemodynamically normal  Not distressed    Abdomen is gravid  Bedside ultrasound shows single fetus with heart rate of 163, and the head up position. Cervix is closed, thick and high. 5:15 PM  [facility] paged for transfer to OB/GYN hospital.    Discussed with Dr. Greg Tran with OB/GYN, she agrees to transfer patient. EMTALA completed. Patient transported to [facility] emergently, ALS with lights and sirens. Procedures    Critical Care Time: 45 minutes of critical care time for pregnancy concerning for labor. Diagnosis     Clinical Impression:   1. Abdominal pain, lower    2. 24 weeks gestation of pregnancy        Disposition: Transfer to [facility]    Follow-up Information    None          There are no discharge medications for this patient. Berkeley Brunner, MD   Emergency Medicine   January 23, 2022, 5:13 PM     This note is dictated utilizing Dragon voice recognition software. Unfortunately this leads to occasional typographical errors using the voice recognition. I apologize in advance if the situation occurs. If questions occur please do not hesitate to contact me directly.     Natalie Murphy MD

## 2023-02-08 ENCOUNTER — HOSPITAL ENCOUNTER (EMERGENCY)
Age: 23
Discharge: HOME | End: 2023-02-08
Attending: EMERGENCY MEDICINE
Payer: COMMERCIAL

## 2023-02-08 VITALS
SYSTOLIC BLOOD PRESSURE: 129 MMHG | RESPIRATION RATE: 16 BRPM | TEMPERATURE: 97.8 F | HEART RATE: 65 BPM | OXYGEN SATURATION: 98 % | DIASTOLIC BLOOD PRESSURE: 79 MMHG

## 2023-02-08 DIAGNOSIS — Z32.02 NEGATIVE PREGNANCY TEST: ICD-10-CM

## 2023-02-08 DIAGNOSIS — J02.9 SORE THROAT: Primary | ICD-10-CM

## 2023-02-08 LAB
APPEARANCE UR: CLEAR
BILIRUB UR QL: NEGATIVE
COLOR UR: YELLOW
DEPRECATED S PYO AG THROAT QL EIA: NEGATIVE
GLUCOSE UR STRIP.AUTO-MCNC: NEGATIVE MG/DL
HCG UR QL: NEGATIVE
HGB UR QL STRIP: NEGATIVE
KETONES UR QL STRIP.AUTO: ABNORMAL MG/DL
LEUKOCYTE ESTERASE UR QL STRIP.AUTO: NEGATIVE
NITRITE UR QL STRIP.AUTO: NEGATIVE
PH UR STRIP: 6.5 (ref 5–8)
PROT UR STRIP-MCNC: NEGATIVE MG/DL
SP GR UR REFRACTOMETRY: 1.02 (ref 1–1.03)
UROBILINOGEN UR QL STRIP.AUTO: 0.2 EU/DL (ref 0.2–1)

## 2023-02-08 PROCEDURE — 87070 CULTURE OTHR SPECIMN AEROBIC: CPT

## 2023-02-08 PROCEDURE — 87880 STREP A ASSAY W/OPTIC: CPT

## 2023-02-08 PROCEDURE — 99283 EMERGENCY DEPT VISIT LOW MDM: CPT

## 2023-02-08 PROCEDURE — 81003 URINALYSIS AUTO W/O SCOPE: CPT

## 2023-02-08 PROCEDURE — 81025 URINE PREGNANCY TEST: CPT

## 2023-02-08 RX ORDER — NAPROXEN 500 MG/1
500 TABLET ORAL 2 TIMES DAILY WITH MEALS
Qty: 20 TABLET | Refills: 0 | Status: SHIPPED | OUTPATIENT
Start: 2023-02-08 | End: 2023-02-18

## 2023-02-08 NOTE — ED PROVIDER NOTES
EMERGENCY DEPARTMENT HISTORY AND PHYSICAL EXAM    Date: 2/8/2023  Patient Name: Jennifer Alvarenga    History of Presenting Illness     Chief Complaint   Patient presents with    Sore Throat    Back Pain         History Provided By: Patient      Additional History (Context): Jennifer Alvarenga is a 25 y.o. female with a history of vaginal infections who presents today for a sore throat for the past couple days and concern for pregnancy. Patient reports last time she felt like this she was incidentally found to be pregnant. Reports she has also been around her boyfriend who recently had a sore throat that resolved on its own. Patient denies any other symptoms or complaints at this time. Denies any vaginal discharge or bleeding. PCP: None    Current Outpatient Medications   Medication Sig Dispense Refill    naproxen (Naprosyn) 500 mg tablet Take 1 Tablet by mouth two (2) times daily (with meals) for 10 days. 20 Tablet 0       Past History     Past Medical History:  Past Medical History:   Diagnosis Date    Chlamydia     Conjunctivitis     Gonorrhea     Ill-defined condition     Seizures ([facility] [address]     Trichomoniasis     UTI (urinary tract infection)     Vaginal yeast infection        Past Surgical History:  No past surgical history on file. Family History:  No family history on file. Social History:  Social History     Tobacco Use    Smoking status: Former    Smokeless tobacco: Never   Substance Use Topics    Alcohol use: Not Currently    Drug use: Not Currently     Types: Marijuana       Allergies: Allergies   Allergen Reactions    Ciprofloxacin Rash         Review of Systems   Review of Systems   Constitutional:  Negative for chills and fever. HENT:  Positive for sore throat. Negative for congestion and rhinorrhea. Respiratory:  Negative for cough and shortness of breath. Cardiovascular:  Negative for chest pain.    Gastrointestinal:  Negative for abdominal pain, blood in stool, constipation, diarrhea, nausea and vomiting. Genitourinary:  Positive for menstrual problem. Negative for dysuria, frequency and hematuria. Musculoskeletal:  Negative for back pain and myalgias. Skin:  Negative for rash and wound. Neurological:  Negative for dizziness and headaches. All other systems reviewed and are negative. All Other Systems Negative  Physical Exam     Vitals:    02/08/23 1347   BP: 129/79   Pulse: 65   Resp: 16   Temp: 97.8 °F (36.6 °C)   SpO2: 98%     Physical Exam  Vitals and nursing note reviewed. Constitutional:       General: She is not in acute distress. Appearance: She is well-developed. She is not diaphoretic. Comments: Well-appearing, no distress   HENT:      Head: Normocephalic and atraumatic. Eyes:      Conjunctiva/sclera: Conjunctivae normal.   Cardiovascular:      Rate and Rhythm: Normal rate and regular rhythm. Heart sounds: Normal heart sounds. Pulmonary:      Effort: Pulmonary effort is normal. No respiratory distress. Breath sounds: Normal breath sounds. Chest:      Chest wall: No tenderness. Abdominal:      General: Bowel sounds are normal. There is no distension. Palpations: Abdomen is soft. Tenderness: There is no abdominal tenderness. There is no guarding or rebound. Musculoskeletal:         General: No deformity. Cervical back: Normal range of motion and neck supple. Skin:     General: Skin is warm and dry. Neurological:      Mental Status: She is alert and oriented to person, place, and time. Deep Tendon Reflexes: Reflexes are normal and symmetric.          Diagnostic Study Results     Labs -     Recent Results (from the past 12 hour(s))   HCG URINE, QL    Collection Time: 02/08/23  1:49 PM   Result Value Ref Range    HCG urine, QL Negative NEG     URINALYSIS W/ RFLX MICROSCOPIC    Collection Time: 02/08/23  1:49 PM   Result Value Ref Range    Color YELLOW      Appearance CLEAR      Specific gravity 1.016 1.005 - 1.030      pH (UA) 6.5 5.0 - 8.0      Protein Negative NEG mg/dL    Glucose Negative NEG mg/dL    Ketone TRACE (A) NEG mg/dL    Bilirubin Negative NEG      Blood Negative NEG      Urobilinogen 0.2 0.2 - 1.0 EU/dL    Nitrites Negative NEG      Leukocyte Esterase Negative NEG     STREP AG SCREEN, GROUP A    Collection Time: 02/08/23  2:30 PM    Specimen: Throat   Result Value Ref Range    Group A Strep Ag ID Negative         Radiologic Studies -   No orders to display     CT Results  (Last 48 hours)      None          CXR Results  (Last 48 hours)      None              Medical Decision Making   I am the first provider for this patient. I reviewed the vital signs, available nursing notes, past medical history, past surgical history, family history and social history. Vital Signs-Reviewed the patient's vital signs. Records Reviewed: Nursing Notes and Old Medical Records     Procedures: None   Procedures    Provider Notes (Medical Decision Making):     Differential Diagnosis:  influenza, mononucleosis, acute bronchitis, URI, streptococcal pharyngitis, pneumonia, asthma exacerbation, allergic rhinitis, seasonal allergies, COVID, pregnancy    Plan: We will screen for strep throat and pregnancy    3:36 PM  Patient is negative for pregnancy and strep. Will discharge home with Naprosyn as needed for discomfor and have encouraged close primary care follow-up. Have advised rest hydration and home isolation. Will discharge home. MED RECONCILIATION:  No current facility-administered medications for this encounter. Current Outpatient Medications   Medication Sig    naproxen (Naprosyn) 500 mg tablet Take 1 Tablet by mouth two (2) times daily (with meals) for 10 days. Disposition:  Home     DISCHARGE NOTE:   Pt has been reexamined. Patient has no new complaints, changes, or physical findings. Care plan outlined and precautions discussed. Results of workup were reviewed with the patient.  All medications were reviewed with the patient. All of pt's questions and concerns were addressed. Patient was instructed and agrees to follow up with PCP as well as to return to the ED upon further deterioration. Patient is ready to go home. Follow-up Information       Follow up With Specialties Details Why Contact Info    [facility] EMERGENCY DEPT Emergency Medicine  As needed [address]  [phone]    Follow-u in 1-2 days                Current Discharge Medication List        START taking these medications    Details   naproxen (Naprosyn) 500 mg tablet Take 1 Tablet by mouth two (2) times daily (with meals) for 10 days. Qty: 20 Tablet, Refills: 0  Start date: 2/8/2023, End date: 2/18/2023                 Diagnosis     Clinical Impression:   1. Sore throat    2. Negative pregnancy test          \"Please note that this dictation was completed with [employer], the computer voice recognition software. Quite often unanticipated grammatical, syntax, homophones, and other interpretive errors are inadvertently transcribed by the computer software. Please disregard these errors. Please excuse any errors that have escaped final proofreading. \"

## 2023-02-08 NOTE — Clinical Note
[address] [place] [address] [facility] EMERGENCY DEPT  [id number] [facility] [address] [phone]    Work/School Note    Date: 2/8/2023    To Whom It May concern:    Ning Stewart was seen and treated today in the emergency room by the following provider(s):  Attending Provider: Dewayne Link MD  Physician Assistant: EYAD Quinteros. Ning Stewart is excused from work/school on 2/8/2023 through 2/10/2023. She is medically clear to return to work/school on 2/11/2023.          Sincerely,          EYAD Gonzales

## 2023-02-08 NOTE — Clinical Note
[address] [place] [address] Dr [facility] EMERGENCY DEPT  [id number] [address] [phone]    Work/School Note    Date: 2/8/2023    To Whom It May concern:    Keiry Montez was seen and treated today in the emergency room by the following provider(s):  Attending Provider: Butler Leyden, MD  Physician Assistant: EYAD Yang. Keiry Montez is excused from work/school on 2/8/2023 through 2/10/2023. She is medically clear to return to work/school on 2/11/2023.          Sincerely,          EYAD Haskins

## 2023-02-10 LAB
BACTERIA SPEC CULT: NORMAL
SERVICE CMNT-IMP: NORMAL

## 2023-02-16 ENCOUNTER — HOSPITAL ENCOUNTER (EMERGENCY)
Facility: HOSPITAL | Age: 23
Discharge: HOME | End: 2023-02-16
Attending: EMERGENCY MEDICINE

## 2023-02-16 VITALS
SYSTOLIC BLOOD PRESSURE: 134 MMHG | HEIGHT: 66 IN | HEART RATE: 61 BPM | BODY MASS INDEX: 22.98 KG/M2 | OXYGEN SATURATION: 100 % | WEIGHT: 143 LBS | TEMPERATURE: 97.2 F | RESPIRATION RATE: 18 BRPM | DIASTOLIC BLOOD PRESSURE: 77 MMHG

## 2023-02-16 DIAGNOSIS — R05.1 ACUTE COUGH: ICD-10-CM

## 2023-02-16 DIAGNOSIS — J40 BRONCHITIS: Primary | ICD-10-CM

## 2023-02-16 DIAGNOSIS — Z72.0 TOBACCO ABUSE: ICD-10-CM

## 2023-02-16 LAB
DEPRECATED S PYO AG THROAT QL EIA: NEGATIVE
FLUAV AG NPH QL IA: NEGATIVE
FLUBV AG NOSE QL IA: NEGATIVE
SARS-COV-2 RDRP RESP QL NAA+PROBE: NOT DETECTED
SOURCE: NORMAL

## 2023-02-16 PROCEDURE — 99283 EMERGENCY DEPT VISIT LOW MDM: CPT

## 2023-02-16 PROCEDURE — 87880 STREP A ASSAY W/OPTIC: CPT

## 2023-02-16 PROCEDURE — 87635 SARS-COV-2 COVID-19 AMP PRB: CPT

## 2023-02-16 PROCEDURE — 87070 CULTURE OTHR SPECIMN AEROBIC: CPT

## 2023-02-16 PROCEDURE — 87804 INFLUENZA ASSAY W/OPTIC: CPT

## 2023-02-16 RX ORDER — METHYLPREDNISOLONE 4 MG/1
TABLET ORAL
Qty: 1 KIT | Refills: 0 | Status: SHIPPED | OUTPATIENT
Start: 2023-02-16 | End: 2023-02-22

## 2023-02-16 RX ORDER — ALBUTEROL SULFATE 90 UG/1
2 AEROSOL, METERED RESPIRATORY (INHALATION) EVERY 4 HOURS PRN
Qty: 1 EACH | Refills: 0 | Status: SHIPPED | OUTPATIENT
Start: 2023-02-16 | End: 2023-02-23

## 2023-02-16 RX ORDER — AZITHROMYCIN 250 MG/1
250 TABLET, FILM COATED ORAL SEE ADMIN INSTRUCTIONS
Qty: 6 TABLET | Refills: 0 | Status: SHIPPED | OUTPATIENT
Start: 2023-02-16 | End: 2023-02-21

## 2023-02-16 ASSESSMENT — ENCOUNTER SYMPTOMS
SORE THROAT: 1
ABDOMINAL PAIN: 0
COUGH: 1
BACK PAIN: 0
EYE DISCHARGE: 1
WHEEZING: 0
NAUSEA: 0
SHORTNESS OF BREATH: 0
EYE REDNESS: 0
VOMITING: 0
DIARRHEA: 0
CONSTIPATION: 0
RHINORRHEA: 1

## 2023-02-16 ASSESSMENT — PAIN - FUNCTIONAL ASSESSMENT: PAIN_FUNCTIONAL_ASSESSMENT: NONE - DENIES PAIN

## 2023-02-16 NOTE — Clinical Note
Chyna Garibay was seen and treated in our emergency department on 2/16/2023. She may return to work on 02/17/2023. If you have any questions or concerns, please don't hesitate to call.       AWAIS Quintanilla

## 2023-02-16 NOTE — DISCHARGE INSTRUCTIONS
Take OTC Mucinex DM, Zyrtec, and Flonase for cough and congestion. Thank you for enrolling in [address] Please follow the instructions below to securely access your online medical record. [facility] allows you to send messages to your doctor, view your test results, renew your prescriptions, schedule appointments, and more. How Do I Sign Up? In your Internet browser, go to [email or web address]  Click on the Sign Up Now link in the Sign In box. You will see the New Member Sign Up page. Enter your [employer] Access Code exactly as it appears below. You will not need to use this code after youve completed the sign-up process. If you do not sign up before the expiration date, you must request a new code. [facility] Access Code: Activation code not generated  Current [facility] Status: Active    Enter your Social Security Number ([id number]) and Date of Birth (mm/dd/yyyy) as indicated and click Submit. You will be taken to the next sign-up page. Create a [facility] ID. This will be your [facility] login ID and cannot be changed, so think of one that is secure and easy to remember. Create a [facility] password. You can change your password at any time. Enter your Password Reset Question and Answer. This can be used at a later time if you forget your password. Enter your e-mail address. You will receive e-mail notification when new information is available in [address] Click Sign Up. You can now view your medical record. Additional Information  If you have questions, please contact your physician practice where you receive care. Remember, [facility] is NOT to be used for urgent needs. For medical emergencies, dial 911.

## 2023-02-16 NOTE — ED PROVIDER NOTES
[facility] EMERGENCY DEPT  EMERGENCY DEPARTMENT ENCOUNTER      Pt Name: Sonny Barrow  Pt Age: 25 y.o. Sex: female   MRN: [id number]  CSN: [id number]   Birthdate 2000  Date of evaluation: 2/16/2023  Provider: AWAIS Long  Room: [address]  Time Dictated: 3:54 PM    Chief Complaint   Chief Complaint   Patient presents with    Eye Drainage    Cough    Pharyngitis       History of Present Illness   The history is provided by the patient. 1:55 PM  25 y.o. female presents to the ED C/O a dry cough, sore throat, LT milky eye discharge and a runny/stuffy nose for the last 2 weeks. Patient reports that she went to [facility] on 02/08/2023 and they checked for strep throat and a UA but nothing else. They prescribed her Naprosyn, she took one dose and he made her really sick to her stomach so she didn't take it again. Patient has not taken any OTC medications for the symptoms. No fever, chills, SOB, difficulty breathing, difficulty swallowing, glossitis, back pain, chest pain, or wheezing. Nursing Note reviewed    REVIEW OF SYSTEMS    (2-9 systems for level 4, 10 or more for level 5)   Review of Systems   Constitutional:  Negative for chills and fever. HENT:  Positive for congestion, postnasal drip, rhinorrhea and sore throat. Eyes:  Positive for discharge (Left eye milky discharge this morning). Negative for redness. Respiratory:  Positive for cough. Negative for shortness of breath and wheezing. Cardiovascular:  Negative for chest pain. Gastrointestinal:  Negative for abdominal pain, constipation, diarrhea, nausea and vomiting. Endocrine: Negative for cold intolerance and heat intolerance. Genitourinary:  Negative for dysuria, frequency and urgency. Musculoskeletal:  Negative for back pain and myalgias. Skin:  Negative for rash and wound. Allergic/Immunologic: Negative for environmental allergies and immunocompromised state. Neurological:  Negative for dizziness and headaches.    Hematological: Negative for adenopathy. Psychiatric/Behavioral:  Negative for agitation, confusion and sleep disturbance. PAST MEDICAL HISTORY     Past Medical History:   Diagnosis Date    Chlamydia     Conjunctivitis     Gonorrhea     Ill-defined condition     Seizures ([address]     Trichomoniasis     UTI (urinary tract infection)     Vaginal yeast infection          SURGICAL HISTORY     History reviewed. No pertinent surgical history. CURRENT MEDICATIONS       Previous Medications    No medications on file       ALLERGIES     Patient has no known allergies. FAMILY HISTORY     History reviewed. No pertinent family history. SOCIAL HISTORY       Social History     Socioeconomic History    Marital status: Single     Spouse name: None    Number of children: None    Years of education: None    Highest education level: None   Tobacco Use    Smoking status: Former    Smokeless tobacco: Never   Substance and Sexual Activity    Alcohol use: Not Currently    Drug use: Yes     Types: Marijuana ([address])       SCREENINGS     Shant Coma Scale  Eye Opening: Spontaneous  Best Verbal Response: Oriented  Best Motor Response: Obeys commands  Shant Coma Scale Score: 15       CIWA Assessment  BP: 134/77  Heart Rate: 61     PHYSICAL EXAM    (up to 7 for level 4, 8 or more for level 5)     ED Triage Vitals [02/16/23 1347]   BP Temp Temp Source Heart Rate Resp SpO2 Height Weight   134/77 97.2 °F (36.2 °C) Temporal 61 18 100 % 5' 6\" (1.676 m) 143 lb (64.9 kg)       Physical Exam  Vitals and nursing note reviewed. Constitutional:       General: She is not in acute distress. Appearance: She is well-developed and normal weight. She is ill-appearing. She is not toxic-appearing or diaphoretic. HENT:      Head: Normocephalic and atraumatic. Right Ear: Tympanic membrane and ear canal normal. No middle ear effusion. Tympanic membrane is not erythematous. Left Ear: Tympanic membrane and ear canal normal.  No middle ear effusion. Tympanic membrane is not erythematous. Nose: Congestion and rhinorrhea present. Mouth/Throat:      Mouth: Mucous membranes are moist. No oral lesions. Pharynx: Oropharynx is clear. Posterior oropharyngeal erythema present. No pharyngeal swelling, oropharyngeal exudate or uvula swelling. Tonsils: No tonsillar exudate or tonsillar abscesses. 1+ on the right. 1+ on the left. Comments: Clear Post-nasal drip  Eyes:      Extraocular Movements:      Right eye: Normal extraocular motion. Left eye: Normal extraocular motion. Conjunctiva/sclera: Conjunctivae normal.      Pupils: Pupils are equal, round, and reactive to light. Comments: No current discharge   Cardiovascular:      Rate and Rhythm: Normal rate and regular rhythm. Heart sounds: Normal heart sounds. No murmur heard. No friction rub. No gallop. Pulmonary:      Effort: Pulmonary effort is normal. No respiratory distress. Breath sounds: Normal breath sounds. No stridor. No wheezing, rhonchi or rales. Chest:      Chest wall: No tenderness. Musculoskeletal:      Cervical back: Normal range of motion and neck supple. Lymphadenopathy:      Cervical: No cervical adenopathy. Skin:     General: Skin is warm and dry. Capillary Refill: Capillary refill takes less than 2 seconds. Findings: No erythema or rash. Neurological:      General: No focal deficit present. Mental Status: She is alert.    Psychiatric:         Mood and Affect: Mood normal.         Behavior: Behavior normal.       DIAGNOSTIC RESULTS     No orders to display       Interpretation per the Radiologist below, if available at the time of this note:    No orders to display       RADIOLOGY:   Non-plain film images such as CT, Ultrasound and MRI are read by the radiologist. Plain radiographic images are visualized and preliminarily interpreted by the emergency physician with the below findings:    LABS:  Labs Reviewed   RAPID INFLUENZA A/B ANTIGENS   RAPID STREP SCREEN   COVID-19, RAPID   CULTURE, THROAT       All other labs were within normal range or not returned as of this dictation. Recent Results (from the past 12 hour(s))   Rapid influenza A/B antigens    Collection Time: 02/16/23  1:48 PM    Specimen: Nasal Washing   Result Value Ref Range    Influenza A Ag Negative NEG      Influenza B Ag Negative NEG     Rapid Strep Screen    Collection Time: 02/16/23  1:48 PM    Specimen: Throat   Result Value Ref Range    Strep A Ag Negative     COVID-19, Rapid    Collection Time: 02/16/23  1:48 PM    Specimen: Nasopharyngeal   Result Value Ref Range    Source Nasopharyngeal      SARS-CoV-2, Rapid Not detected NOTD         MEDICATIONS GIVEN:  Medications - No data to display    PROCEDURES:  Unless otherwise noted below, none     Procedures    Impression / ED Course / Medical Decision Making   Vitals:    Vitals:    02/16/23 1347   BP: 134/77   Pulse: 61   Resp: 18   Temp: 97.2 °F (36.2 °C)   TempSrc: Temporal   SpO2: 100%   Weight: 143 lb (64.9 kg)   Height: 5' 6\" (1.676 m)     Medical Decision Making  Amount and/or Complexity of Data Reviewed  External Data Reviewed: labs. Labs: ordered. Decision-making details documented in ED Course. Risk  OTC drugs. DDx: Acute URI, Acute Bronchitis, Pneumonia, Covid-19, Influenza, Strep throat, Pharyngitis, Tonsillitis, Viral Syndrome. Impression: Acute bronchitis    Management Plan: Evaluate and treat Eye drainage, cough, runny/stuffy nose, and sore throat. Obtained labs. Patient was in agreement with discharge plan and patient was discharged in good condition. PROGRESS NOTE:   1:55 PM  Initial assessment completed. REASSESSMENT        CONSULTS:  None    DISCHARGE NOTE:  3:54 PM   Debby Perez's  results have been reviewed with her. She has been counseled regarding her diagnosis, treatment, and plan.   She verbally conveys understanding and agreement of the signs, symptoms, diagnosis, treatment and prognosis and additionally agrees to follow up as discussed. She also agrees with the care-plan and conveys that all of her questions have been answered. I have also provided discharge instructions for her that include: educational information regarding their diagnosis and treatment, and list of reasons why they would want to return to the ED prior to their follow-up appointment, should her condition change. FINAL IMPRESSION      1. Bronchitis    2. Acute cough    3. Tobacco abuse            DISPOSITION/PLAN   DISPOSITION Decision To Discharge 02/16/2023 03:52:25 PM       Medication List        START taking these medications      albuterol sulfate  (90 Base) MCG/ACT inhaler  Commonly known as: PROVENTIL;VENTOLIN;PROAIR  Inhale 2 puffs into the lungs every 4 hours as needed for Wheezing     azithromycin 250 MG tablet  Commonly known as: ZITHROMAX  Take 1 tablet by mouth See Admin Instructions for 5 days 500mg on day 1 followed by 250mg on days 2 - 5     methylPREDNISolone 4 MG tablet  Commonly known as: MEDROL DOSEPACK  Take by mouth.                Where to Get Your Medications        These medications were sent to [address] [place] [id number]      Phone: [phone]   albuterol sulfate  (90 Base) MCG/ACT inhaler  azithromycin 250 MG tablet  methylPREDNISolone 4 MG tablet          PATIENT REFERRED TO:  None None    Schedule an appointment as soon as possible for a visit in 1 week  Follow up within 1 week, Return to ED sooner if symptoms worsen,     DISCHARGE MEDICATIONS:  New Prescriptions    ALBUTEROL SULFATE HFA (PROVENTIL;VENTOLIN;PROAIR) 108 (90 BASE) MCG/ACT INHALER    Inhale 2 puffs into the lungs every 4 hours as needed for Wheezing    AZITHROMYCIN (ZITHROMAX) 250 MG TABLET    Take 1 tablet by mouth See Admin Instructions for 5 days 500mg on day 1 followed by 250mg on days 2 - 5    METHYLPREDNISOLONE (MEDROL DOSEPACK) 4 MG TABLET    Take by mouth. Controlled Substances Monitoring:     No flowsheet data found. Nursing notes have been reviewed by the physician/advanced practice    Clinician.     Pam Vazquez PA-C  February 16, 2023          Antionette Sam  02/16/23 [id number]

## 2023-02-16 NOTE — ED TRIAGE NOTES
Pt is ambulatory to triage with complaints of eye pain and drainage, cough, sore throat, body aches since last week. States was seen at [facility] for something else and asked to be tested for covid and flu and was not.

## 2023-02-18 LAB
BACTERIA SPEC CULT: NORMAL
SERVICE CMNT-IMP: NORMAL

## 2024-11-17 ENCOUNTER — HOSPITAL ENCOUNTER (EMERGENCY)
Facility: HOSPITAL | Age: 24
Discharge: HOME | End: 2024-11-17

## 2024-11-17 VITALS
BODY MASS INDEX: 22.45 KG/M2 | SYSTOLIC BLOOD PRESSURE: 128 MMHG | HEART RATE: 70 BPM | WEIGHT: 122 LBS | OXYGEN SATURATION: 99 % | RESPIRATION RATE: 17 BRPM | TEMPERATURE: 98.1 F | DIASTOLIC BLOOD PRESSURE: 70 MMHG | HEIGHT: 62 IN
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
## 2024-11-17 PROCEDURE — 99283 EMERGENCY DEPT VISIT LOW MDM: CPT

## 2024-11-17 RX ORDER — KETOROLAC TROMETHAMINE 10 MG/1
10 TABLET, FILM COATED ORAL EVERY 6 HOURS PRN
Qty: 20 TABLET | Refills: 0 | Status: SHIPPED | OUTPATIENT
Start: 2024-11-17

## 2024-11-17 RX ORDER — CYCLOBENZAPRINE HCL 10 MG
10 TABLET ORAL 3 TIMES DAILY PRN
Qty: 21 TABLET | Refills: 0 | Status: SHIPPED | OUTPATIENT
Start: 2024-11-17 | End: 2024-11-27

## 2024-11-17 RX ORDER — ACETAMINOPHEN 325 MG/1
650 TABLET ORAL EVERY 6 HOURS PRN
Qty: 120 TABLET | Refills: 3 | Status: SHIPPED | OUTPATIENT
Start: 2024-11-17

## 2024-11-17 ASSESSMENT — PAIN - FUNCTIONAL ASSESSMENT
PAIN_FUNCTIONAL_ASSESSMENT: 0-10
PAIN_FUNCTIONAL_ASSESSMENT: NONE - DENIES PAIN

## 2024-11-17 ASSESSMENT — PAIN SCALES - GENERAL: PAINLEVEL_OUTOF10: 6
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
## 2025-02-26 ENCOUNTER — TRANSCRIBE ORDERS (OUTPATIENT)
Facility: HOSPITAL | Age: 25
End: 2025-02-26

## 2025-02-26 DIAGNOSIS — G40.209 LOCAL-REL SYMPTC EPI W CMPLX PRT SEIZ,NOT NTRCT,W/O STAT EPI (HCC): Primary | ICD-10-CM

## 2025-04-01 ENCOUNTER — HOSPITAL ENCOUNTER (EMERGENCY)
Facility: HOSPITAL | Age: 25
Discharge: HOME | End: 2025-04-01
Attending: EMERGENCY MEDICINE
Payer: COMMERCIAL

## 2025-04-01 VITALS
HEIGHT: 62 IN | OXYGEN SATURATION: 100 % | WEIGHT: 138 LBS | HEART RATE: 76 BPM | TEMPERATURE: 98.4 F | DIASTOLIC BLOOD PRESSURE: 84 MMHG | RESPIRATION RATE: 18 BRPM | SYSTOLIC BLOOD PRESSURE: 118 MMHG | BODY MASS INDEX: 25.4 KG/M2

## 2025-04-01 DIAGNOSIS — J06.9 ACUTE UPPER RESPIRATORY INFECTION: ICD-10-CM

## 2025-04-01 DIAGNOSIS — J01.00 ACUTE NON-RECURRENT MAXILLARY SINUSITIS: Primary | ICD-10-CM

## 2025-04-01 LAB
FLUAV RNA SPEC QL NAA+PROBE: NOT DETECTED
FLUBV RNA SPEC QL NAA+PROBE: NOT DETECTED
SARS-COV-2 RNA RESP QL NAA+PROBE: NOT DETECTED
SOURCE: NORMAL

## 2025-04-01 PROCEDURE — 6370000000 HC RX 637 (ALT 250 FOR IP): Performed by: EMERGENCY MEDICINE

## 2025-04-01 PROCEDURE — 87636 SARSCOV2 & INF A&B AMP PRB: CPT

## 2025-04-01 PROCEDURE — 99283 EMERGENCY DEPT VISIT LOW MDM: CPT

## 2025-04-01 RX ORDER — GUAIFENESIN 600 MG/1
600 TABLET, EXTENDED RELEASE ORAL 2 TIMES DAILY
Qty: 30 TABLET | Refills: 0 | Status: SHIPPED | OUTPATIENT
Start: 2025-04-01 | End: 2025-04-16

## 2025-04-01 RX ORDER — AZITHROMYCIN 500 MG/1
500 TABLET, FILM COATED ORAL DAILY
Qty: 3 TABLET | Refills: 0 | Status: SHIPPED | OUTPATIENT
Start: 2025-04-01 | End: 2025-04-04

## 2025-04-01 RX ORDER — IBUPROFEN 400 MG/1
400 TABLET, FILM COATED ORAL
Status: COMPLETED | OUTPATIENT
Start: 2025-04-01 | End: 2025-04-01

## 2025-04-01 RX ORDER — AZITHROMYCIN 250 MG/1
500 TABLET, FILM COATED ORAL
Status: COMPLETED | OUTPATIENT
Start: 2025-04-01 | End: 2025-04-01

## 2025-04-01 RX ORDER — GUAIFENESIN 600 MG/1
600 TABLET, EXTENDED RELEASE ORAL
Status: DISCONTINUED | OUTPATIENT
Start: 2025-04-01 | End: 2025-04-01 | Stop reason: HOSPADM

## 2025-04-01 RX ADMIN — AZITHROMYCIN DIHYDRATE 500 MG: 250 TABLET ORAL at 21:07

## 2025-04-01 RX ADMIN — IBUPROFEN 400 MG: 400 TABLET, FILM COATED ORAL at 21:07

## 2025-04-01 ASSESSMENT — PAIN - FUNCTIONAL ASSESSMENT: PAIN_FUNCTIONAL_ASSESSMENT: 0-10

## 2025-04-01 ASSESSMENT — PAIN SCALES - GENERAL: PAINLEVEL_OUTOF10: 4

## 2025-04-02 NOTE — ED PROVIDER NOTES
Phone: [phone]   azithromycin 500 MG tablet  guaiFENesin 600 MG extended release tablet       3. [unfilled]  _______________________________    This note was partially transcribed via voice recognition software. Although efforts have been made to catch any discrepancies, it may contain sound alike words, grammatical errors, or nonsensical words.             Jose Rafael Kern MD  04/03/25 0863

## 2025-05-03 ENCOUNTER — HOSPITAL ENCOUNTER (EMERGENCY)
Facility: HOSPITAL | Age: 25
Discharge: HOME | End: 2025-05-03
Attending: EMERGENCY MEDICINE
Payer: COMMERCIAL

## 2025-05-03 VITALS
HEART RATE: 60 BPM | SYSTOLIC BLOOD PRESSURE: 130 MMHG | DIASTOLIC BLOOD PRESSURE: 68 MMHG | OXYGEN SATURATION: 100 % | BODY MASS INDEX: 24.48 KG/M2 | TEMPERATURE: 97.7 F | HEIGHT: 62 IN | RESPIRATION RATE: 16 BRPM | WEIGHT: 133 LBS

## 2025-05-03 DIAGNOSIS — S09.90XA CLOSED HEAD INJURY, INITIAL ENCOUNTER: Primary | ICD-10-CM

## 2025-05-03 PROCEDURE — 99282 EMERGENCY DEPT VISIT SF MDM: CPT

## 2025-05-03 ASSESSMENT — PAIN - FUNCTIONAL ASSESSMENT
PAIN_FUNCTIONAL_ASSESSMENT: 0-10
PAIN_FUNCTIONAL_ASSESSMENT: 0-10

## 2025-05-03 ASSESSMENT — PAIN DESCRIPTION - LOCATION
LOCATION: HEAD
LOCATION: HEAD

## 2025-05-03 ASSESSMENT — ENCOUNTER SYMPTOMS
GASTROINTESTINAL NEGATIVE: 1
RESPIRATORY NEGATIVE: 1

## 2025-05-03 ASSESSMENT — PAIN SCALES - GENERAL
PAINLEVEL_OUTOF10: 5
PAINLEVEL_OUTOF10: 6
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
## 2025-06-28 ENCOUNTER — HOSPITAL ENCOUNTER (EMERGENCY)
Age: 25
Discharge: HOME | End: 2025-06-28
Attending: STUDENT IN AN ORGANIZED HEALTH CARE EDUCATION/TRAINING PROGRAM

## 2025-06-28 VITALS
WEIGHT: 120 LBS | BODY MASS INDEX: 22.08 KG/M2 | OXYGEN SATURATION: 99 % | SYSTOLIC BLOOD PRESSURE: 151 MMHG | DIASTOLIC BLOOD PRESSURE: 89 MMHG | TEMPERATURE: 98.4 F | HEART RATE: 74 BPM | HEIGHT: 62 IN | RESPIRATION RATE: 14 BRPM

## 2025-06-28 DIAGNOSIS — R30.0 DYSURIA: ICD-10-CM

## 2025-06-28 DIAGNOSIS — B96.89 BV (BACTERIAL VAGINOSIS): Primary | ICD-10-CM

## 2025-06-28 DIAGNOSIS — R10.30 LOWER ABDOMINAL PAIN: ICD-10-CM

## 2025-06-28 DIAGNOSIS — Z71.1 CONCERN ABOUT SEXUALLY TRANSMITTED DISEASE IN FEMALE WITHOUT DIAGNOSIS: ICD-10-CM

## 2025-06-28 DIAGNOSIS — N76.0 BV (BACTERIAL VAGINOSIS): Primary | ICD-10-CM

## 2025-06-28 LAB
APPEARANCE UR: CLEAR
BACTERIA URNS QL MICRO: NEGATIVE /HPF
BILIRUB UR QL: NEGATIVE
COLOR UR: YELLOW
EPITH CASTS URNS QL MICRO: NORMAL /LPF (ref 0–5)
GLUCOSE UR STRIP.AUTO-MCNC: NEGATIVE MG/DL
HCG UR QL: NEGATIVE
HGB UR QL STRIP: NEGATIVE
KETONES UR QL STRIP.AUTO: ABNORMAL MG/DL
LEUKOCYTE ESTERASE UR QL STRIP.AUTO: ABNORMAL
NITRITE UR QL STRIP.AUTO: NEGATIVE
PH UR STRIP: 6 (ref 5–8)
PROT UR STRIP-MCNC: NEGATIVE MG/DL
RBC #/AREA URNS HPF: NEGATIVE /HPF (ref 0–5)
SERVICE CMNT-IMP: NORMAL
SP GR UR REFRACTOMETRY: 1.02 (ref 1–1.03)
UROBILINOGEN UR QL STRIP.AUTO: 1 EU/DL (ref 0.2–1)
WBC URNS QL MICRO: NORMAL /HPF (ref 0–4)
WET PREP GENITAL: NORMAL

## 2025-06-28 PROCEDURE — 87086 URINE CULTURE/COLONY COUNT: CPT

## 2025-06-28 PROCEDURE — 87210 SMEAR WET MOUNT SALINE/INK: CPT

## 2025-06-28 PROCEDURE — 87661 TRICHOMONAS VAGINALIS AMPLIF: CPT

## 2025-06-28 PROCEDURE — 81001 URINALYSIS AUTO W/SCOPE: CPT

## 2025-06-28 PROCEDURE — 81025 URINE PREGNANCY TEST: CPT

## 2025-06-28 PROCEDURE — 6370000000 HC RX 637 (ALT 250 FOR IP): Performed by: STUDENT IN AN ORGANIZED HEALTH CARE EDUCATION/TRAINING PROGRAM

## 2025-06-28 PROCEDURE — 99283 EMERGENCY DEPT VISIT LOW MDM: CPT

## 2025-06-28 PROCEDURE — 87591 N.GONORRHOEAE DNA AMP PROB: CPT

## 2025-06-28 PROCEDURE — 81003 URINALYSIS AUTO W/O SCOPE: CPT

## 2025-06-28 PROCEDURE — 87491 CHLMYD TRACH DNA AMP PROBE: CPT

## 2025-06-28 RX ORDER — NAPROXEN 500 MG/1
500 TABLET ORAL 2 TIMES DAILY WITH MEALS
Qty: 20 TABLET | Refills: 0 | Status: SHIPPED | OUTPATIENT
Start: 2025-06-28

## 2025-06-28 RX ORDER — METRONIDAZOLE 500 MG/1
500 TABLET ORAL
Status: COMPLETED | OUTPATIENT
Start: 2025-06-28 | End: 2025-06-28

## 2025-06-28 RX ORDER — DICYCLOMINE HYDROCHLORIDE 10 MG/1
20 CAPSULE ORAL 4 TIMES DAILY
Qty: 21 CAPSULE | Refills: 0 | Status: SHIPPED | OUTPATIENT
Start: 2025-06-28

## 2025-06-28 RX ORDER — METRONIDAZOLE 500 MG/1
500 TABLET ORAL 2 TIMES DAILY
Qty: 14 TABLET | Refills: 0 | Status: SHIPPED | OUTPATIENT
Start: 2025-06-28 | End: 2025-07-05

## 2025-06-28 RX ADMIN — METRONIDAZOLE 500 MG: 500 TABLET ORAL at 23:14

## 2025-06-28 ASSESSMENT — PAIN SCALES - GENERAL: PAINLEVEL_OUTOF10: 5

## 2025-06-28 ASSESSMENT — LIFESTYLE VARIABLES
HOW OFTEN DO YOU HAVE A DRINK CONTAINING ALCOHOL: NEVER
HOW MANY STANDARD DRINKS CONTAINING ALCOHOL DO YOU HAVE ON A TYPICAL DAY: PATIENT DOES NOT DRINK

## 2025-06-28 ASSESSMENT — PAIN - FUNCTIONAL ASSESSMENT: PAIN_FUNCTIONAL_ASSESSMENT: 0-10

## 2025-06-29 NOTE — ED TRIAGE NOTES
Pt has had lower abdominal pain for a month. Also C/O dysuria. Pt had a doctor appointment today but her insurance was canceled do to going part time at work. She recently found out her S/O was cheating on her and wants STD screening.

## 2025-06-29 NOTE — ED PROVIDER NOTES
EMERGENCY DEPARTMENT HISTORY AND PHYSICAL EXAM      Date: 6/28/2025  Patient Name: Debby Perez    History of Presenting Illness     Chief Complaint   Patient presents with    Abdominal Pain    Dysuria       History (Context): Debby Perez is a 25 y.o. female  presents to the ED today with chief complaint of abdominal pain, dysuria, and concern for sexually transmitted infection.  Patient states that abdominal pain has been ongoing for the past 2 weeks intermittently.  Denies any inciting event.  Does endorse dysuria over the past few days and recently found out that her significant other was \"cheating on me\" and she is now concerned about sexually-transmitted infection.  She does endorse having a malodorous vaginal discharge however not different in appearance.  She denies any fever, chills, nausea, vomiting, or diarrhea.  Denies any difficulty with bowel movements.      PCP: None, None    Current Facility-Administered Medications   Medication Dose Route Frequency Provider Last Rate Last Admin    metroNIDAZOLE (FLAGYL) tablet 500 mg  500 mg Oral NOW Seng Puente Jr., DO         Current Outpatient Medications   Medication Sig Dispense Refill    metroNIDAZOLE (FLAGYL) 500 MG tablet Take 1 tablet by mouth 2 times daily for 7 days 14 tablet 0    naproxen (NAPROSYN) 500 MG tablet Take 1 tablet by mouth 2 times daily (with meals) 20 tablet 0    dicyclomine (BENTYL) 10 MG capsule Take 2 capsules by mouth 4 times daily 21 capsule 0    acetaminophen (AMINOFEN) 325 MG tablet Take 2 tablets by mouth every 6 hours as needed for Pain (Patient not taking: Reported on 6/28/2025) 120 tablet 3    albuterol sulfate HFA (PROVENTIL;VENTOLIN;PROAIR) 108 (90 Base) MCG/ACT inhaler Inhale 2 puffs into the lungs every 4 hours as needed for Wheezing 1 each 0       Past History     Past Medical History:   Past Medical History:   Diagnosis Date    Chlamydia     Conjunctivitis     Gonorrhea     Ill-defined condition     Seizures (HCC)

## 2025-06-30 LAB
BACTERIA SPEC CULT: NORMAL
C TRACH RRNA SPEC QL NAA+PROBE: NEGATIVE
N GONORRHOEA RRNA SPEC QL NAA+PROBE: NEGATIVE
SERVICE CMNT-IMP: NORMAL
SPECIMEN SOURCE: NORMAL
T VAGINALIS RRNA SPEC QL NAA+PROBE: NEGATIVE